# Patient Record
Sex: MALE | Race: WHITE | Employment: STUDENT | URBAN - METROPOLITAN AREA
[De-identification: names, ages, dates, MRNs, and addresses within clinical notes are randomized per-mention and may not be internally consistent; named-entity substitution may affect disease eponyms.]

---
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
## 2024-08-07 ENCOUNTER — APPOINTMENT (OUTPATIENT)
Dept: ULTRASOUND IMAGING | Facility: HOSPITAL | Age: 19
End: 2024-08-07
Attending: PEDIATRICS
Payer: COMMERCIAL

## 2024-08-07 ENCOUNTER — HOSPITAL ENCOUNTER (EMERGENCY)
Facility: HOSPITAL | Age: 19
Discharge: HOME | End: 2024-08-07
Attending: PEDIATRICS
Payer: COMMERCIAL

## 2024-08-07 VITALS
TEMPERATURE: 98 F | BODY MASS INDEX: 21.64 KG/M2 | RESPIRATION RATE: 18 BRPM | SYSTOLIC BLOOD PRESSURE: 145 MMHG | HEIGHT: 75 IN | WEIGHT: 174 LBS | HEART RATE: 94 BPM | OXYGEN SATURATION: 97 % | DIASTOLIC BLOOD PRESSURE: 69 MMHG

## 2024-08-07 DIAGNOSIS — N43.3 HYDROCELE, UNSPECIFIED HYDROCELE TYPE: Primary | ICD-10-CM

## 2024-08-07 LAB
BILIRUB UR QL STRIP.AUTO: NEGATIVE
CLARITY UR REFRACT.AUTO: CLEAR
COLOR UR AUTO: YELLOW
GLUCOSE UR STRIP.AUTO-MCNC: NORMAL MG/DL
KETONES UR STRIP.AUTO-MCNC: 10 MG/DL
LEUKOCYTE ESTERASE UR QL STRIP.AUTO: NEGATIVE
NITRITE UR QL STRIP.AUTO: NEGATIVE
PH UR STRIP.AUTO: 6 [PH] (ref 5–8)
PROT UR STRIP.AUTO-MCNC: 20 MG/DL
RBC UR QL AUTO: NEGATIVE
SP GR UR STRIP.AUTO: 1.03 (ref 1–1.03)
UROBILINOGEN UR STRIP.AUTO-MCNC: 2 MG/DL

## 2024-08-07 PROCEDURE — 81001 URINALYSIS AUTO W/SCOPE: CPT | Performed by: PEDIATRICS

## 2024-08-07 PROCEDURE — 93975 VASCULAR STUDY: CPT | Performed by: PEDIATRICS

## 2024-08-07 PROCEDURE — 99284 EMERGENCY DEPT VISIT MOD MDM: CPT

## 2024-08-07 PROCEDURE — 76870 US EXAM SCROTUM: CPT | Performed by: PEDIATRICS

## 2024-08-07 NOTE — ED PROVIDER NOTES
Patient Seen in: [facility] Emergency Department      History     Chief Complaint   Patient presents with    Eval-G     Stated Complaint: abd pain    Subjective:   HPI    19-year-old male who is here with 1 hour history of right sided abdominal pain.  Denies any trauma, fevers or URI symptoms.  Denies urinary complaints or penile discharge.  Sexually active with 1 female partner.  No history of STI.    Objective:   Past Medical History:    Scoliosis              History reviewed. No pertinent surgical history.             Social History     Socioeconomic History    Marital status: Single   Tobacco Use    Smoking status: Never    Smokeless tobacco: Never   Vaping Use    Vaping status: Former   Substance and Sexual Activity    Alcohol use: Never    Drug use: Never              Review of Systems    Positive for stated Chief Complaint: Eval-G    Other systems are as noted in HPI.  Constitutional and vital signs reviewed.      All other systems reviewed and negative except as noted above.    Physical Exam     ED Triage Vitals [08/07/24 1739]   /69   Pulse 94   Resp 18   Temp 98.3 °F (36.8 °C)   Temp src Temporal   SpO2 97 %   O2 Device None (Room air)       Current Vitals:   Vital Signs  BP: 145/69  Pulse: 94  Resp: 18  Temp: 98.3 °F (36.8 °C)  Temp src: Temporal    Oxygen Therapy  SpO2: 97 %  O2 Device: None (Room air)            Physical Exam  Constitutional:       General: He is not in acute distress.     Appearance: He is well-developed. He is not diaphoretic.   HENT:      Head: Normocephalic and atraumatic.      Right Ear: External ear normal.      Left Ear: External ear normal.      Nose: Nose normal.      Mouth/Throat:      Mouth: Mucous membranes are moist.   Eyes:      Conjunctiva/sclera: Conjunctivae normal.      Pupils: Pupils are equal, round, and reactive to light.   Pulmonary:      Effort: Pulmonary effort is normal.   Abdominal:      General: Abdomen is flat. There is no distension.       Palpations: There is no mass.      Tenderness: There is no abdominal tenderness. There is no guarding or rebound.      Hernia: No hernia is present.   Genitourinary:     Penis: Normal.       Comments: Mild right-sided testicular tenderness however no swelling, normal lie.  Positive cremasteric reflex bilaterally.  No penile lesions.  No inguinal swelling or lymphadenopathy.  Musculoskeletal:      Cervical back: Normal range of motion and neck supple.   Skin:     General: Skin is warm.      Coloration: Skin is not pale.   Neurological:      General: No focal deficit present.      Mental Status: He is alert and oriented to person, place, and time.   Psychiatric:         Mood and Affect: Mood normal.         Behavior: Behavior normal.           ED Course     Labs Reviewed   URINALYSIS, ROUTINE - Abnormal; Notable for the following components:       Result Value    Ketones Urine 10 (*)     Protein Urine 20 (*)     Urobilinogen Urine 2 (*)     All other components within normal limits             Radiology:  Imaging ordered independently visualized and interpreted by myself (along with review of radiologist's interpretation) and noted the following: No torsion, small hydroceles noted.    US SCROTUM W/ DOPPLER (CPT=93975/15353)    Result Date: 8/7/2024  CONCLUSION:  1. Unremarkable appearance of the testicles and epididymides. 2. Small nonspecific hydroceles.    LOCATION:  [facility]    Dictated by (CST): Jamin Valladares MD on 8/07/2024 at 7:25 PM     Finalized by (CST): Jamin Valladares MD on 8/07/2024 at 7:27 PM        Labs:  ^^ Personally ordered, reviewed, and interpreted all unique tests ordered.  Clinically significant labs noted:     Medications administered:  Medications - No data to display    Pulse oximetry:  Pulse oximetry on room air is 97% and is normal.     Cardiac monitoring:  Initial heart rate is 94 and is normal for age    Vital signs:  Vitals:    08/07/24 1739   BP: 145/69   Pulse: 94   Resp: 18   Temp: 98.3 °F  (36.8 °C)   TempSrc: Temporal   SpO2: 97%   Weight: 78.9 kg   Height: 190.5 cm (6' 3\")       Chart review:  ^^ Review of prior external notes from unique sources (non-[facility] ED records):            MDM      Assessment & Plan:    19 year old male with mild right sided testicular tenderness.  Stable vitals, no acute distress.  Benign abdominal exam.  Mild bilateral testicular tenderness.  Ultrasound obtained and noted small bilateral hydroceles.  UA clear.  Advised Motrin or Tylenol for pain as needed.  Follow-up with urology if not starting to improve.        ^^ Independent historian:   ^^ Prescription drug and OTC medication management considerations: as noted above      Patient or caregiver understands the course of events that occurred in the emergency department. Instructed to return to emergency department or contact PCP for persistent, recurrent, or worsening symptoms.    This report has been produced using speech recognition software and may contain errors related to that system including, but not limited to, errors in grammar, punctuation, and spelling, as well as words and phrases that possibly may have been recognized inappropriately.  If there are any questions or concerns, contact the dictating provider for clarification.     NOTE: The 21st Century Cares Act makes medical notes available to patients.  Be advised that this is a medical document written in medical language and may contain abbreviations or verbiage that is unfamiliar or direct.  It is primarily intended to carry relevant historical information, physical exam findings, and the clinical assessment of the physician.                                    Medical Decision Making  Problems Addressed:  Hydrocele, unspecified hydrocele type: acute illness or injury with systemic symptoms    Amount and/or Complexity of Data Reviewed  Labs: ordered. Decision-making details documented in ED Course.  Radiology: ordered and independent interpretation  performed. Decision-making details documented in ED Course.    Risk  OTC drugs.        Disposition and Plan     Clinical Impression:  1. Hydrocele, unspecified hydrocele type         Disposition:  Discharge  8/7/2024  7:57 pm    Follow-up:  Greg Cox MD  [address] [address]  [phone]    Follow up  As needed, If symptoms worsen          Medications Prescribed:  There are no discharge medications for this patient.

## (undated) NOTE — ED AVS SNAPSHOT
Atiya Phillips   MRN: [id number]    Department:  [facility] Emergency Department   Date of Visit:  12/18/2017           Disclosure     Insurance plans vary and the physician(s) referred by the ER may not be covered by your plan.  Please contact your tell this physician (or your personal doctor if your instructions are to return to your personal doctor) about any new or lasting problems. The primary care or specialist physician will see patients referred from the [facility] Emergency Department.  Christiano Iyer

## (undated) NOTE — LETTER
March 16, 2018    Patient: Kni Ramos   Date of Visit: 3/16/2018       To Whom It May Concern: Alden Dotson was seen and treated in our emergency department on 3/16/2018. He may return to school with no contact sports or gym for 2 weeks.     If you

## (undated) NOTE — ED AVS SNAPSHOT
Rigo Barber   MRN: [id number]    Department:  [facility] Emergency Department   Date of Visit:  3/16/2018           Disclosure     Insurance plans vary and the physician(s) referred by the ER may not be covered by your plan.  Please contact your tell this physician (or your personal doctor if your instructions are to return to your personal doctor) about any new or lasting problems. The primary care or specialist physician will see patients referred from the [facility] Emergency Department.  Kameron Degroot

## (undated) NOTE — LETTER
Date & Time: 8/7/2024, 8:10 PM  Patient: Troy Boyce  Encounter Provider(s):    Srikanth Turner MD       To Whom It May Concern:    Troy Boyce was seen and treated in our department on 8/7/2024. He can return to work.    If you have any questions or concerns, please do not hesitate to call.        _____________________________  REGISTERED NURSE